# Patient Record
Sex: MALE | Race: WHITE | NOT HISPANIC OR LATINO | Employment: FULL TIME | URBAN - NONMETROPOLITAN AREA
[De-identification: names, ages, dates, MRNs, and addresses within clinical notes are randomized per-mention and may not be internally consistent; named-entity substitution may affect disease eponyms.]

---

## 2017-06-06 ENCOUNTER — HISTORY (OUTPATIENT)
Dept: FAMILY MEDICINE | Facility: OTHER | Age: 22
End: 2017-06-06

## 2017-06-06 ENCOUNTER — OFFICE VISIT - GICH (OUTPATIENT)
Dept: FAMILY MEDICINE | Facility: OTHER | Age: 22
End: 2017-06-06

## 2017-06-06 DIAGNOSIS — M76.62 ACHILLES TENDINITIS OF LEFT LOWER EXTREMITY: ICD-10-CM

## 2017-12-27 NOTE — PROGRESS NOTES
"Patient Information     Patient Name MRN Sex Den Tamez [id number] Male 1995      Progress Notes by Kelly Busby MD at 2017  1:15 PM     Author:  Kelly Busby MD Service:  (none) Author Type:  Physician     Filed:  2017  1:50 PM Encounter Date:  2017 Status:  Signed     :  Kelly Busby MD (Physician)            SUBJECTIVE:  Den Catalan is a 21 y.o. male who complains of pain in left heel to back of foot area. 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 This started on 2017.   Immediate symptoms: immediate pain and no significant . Symptoms have been waxing and waning since that time. It seems better in the morning when he wakes up and then worse as the day goes on. Is especially uncomfortable if he wears the rubber boots which have minimal support and his foot moves around a lot in the boat. 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 Prior to this he was working up to 13 hours a day but finds he can't work that long due to the pain. He is taking some ibuprofen but not regularly. Has tried applying ice at the end of the day and did see a chiropractor on 2017 with some tape applied. Prior history of related problems: no prior problems with this area in the past.    OBJECTIVE:  /82  Pulse 84  Ht 1.8 m (5' 10.87\")  Wt 74.4 kg (164 lb)  BMI 22.96 kg/m2    Appearance: in no apparent distress.  Foot/ankle exam: soft tissue  tenderness at the left Achilles tendon diffusely and a little bit up into the posterior calf musculature..  X-ray: not indicated.    ASSESSMENT:  1. Achilles tendinitis of left lower extremity          PLAN:  Ibuprofen 600 mg take 3 times daily with food.  Limit activities and he will stop working in the boots until this is improving. He thinks he can either work " shorter hours or work more hours at his part-time job at the liquor store which doesn't bother it as much.  Rest and ice after activity.  Given a postop shoe with Velcro to see if that helps with discomfort.  Consider physical therapy if not improving.  Kelly Busby MD  1:50 PM 6/6/2017

## 2017-12-28 NOTE — PATIENT INSTRUCTIONS
Patient Information     Patient Name MRN Sex Den Tamez [id number] Male 1995      Patient Instructions by Kelly Busby MD at 2017  1:22 PM     Author:  Kelly Busby MD  Service:  (none) Author Type:  Physician     Filed:  2017  1:26 PM  Encounter Date:  2017 Status:  Addendum     :  Kelly Busby MD (Physician)        Related Notes: Original Note by Kelly Busby MD (Physician) filed at 2017  1:22 PM               Index Irish All languages Exercises Related topics   Achilles Tendon Injury   ________________________________________________________________________  KEY POINTS    An Achilles tendon injury is stretching or tearing of the strong band of tissue that attaches your heel bone to the calf muscle.    Change or stop doing the activities that cause pain until the injury heals.    An Achilles tendon injury can be treated with special shoes or shoe inserts, exercise, ice, and sometimes with medicine or surgery.  ________________________________________________________________________  What is an Achilles tendon injury?   An Achilles tendon injury is a problem with the tendon that connects your heel bone to the calf muscle of your lower leg. Tendons are strong bands of tissue that attach muscle to bone. You use the Achilles tendon when you point your foot down and when you walk, run, or jump.  Tendons can be injured suddenly or they may be slowly damaged over time. You can have tiny or partial tears in your tendon. If you have a complete tear of your tendon, it s called a rupture. Other tendon injuries may be called a strain, tendinosis, or tendonitis.   What is the cause?   Achilles tendon injuries can be caused by:    Overuse of the tendon, such as from lots of uphill running, intense exercise, or sports training or from doing a lot of work that causes you to bend at the knees and ankles    A sudden activity that twists or tears your tendon, such as  jumping, starting to sprint, or falling  You are more likely to have an Achilles tendon problem if you:    Have tight calf muscles or a tight Achilles tendon    Change the type of running shoes you wear, or if you wear high heels most of the day and then switch to lower heeled shoes for exercise    Have a problem called over-pronation, which happens when your feet roll inward and flatten out more than normal when you walk or run  What are the symptoms?   Symptoms may include:    Pain, stiffness, weakness, or swelling in the back of your lower leg    Pain in the back of your leg or ankle when you rise up on your toes    Trouble pointing your foot downward  If the tendon is completely torn, you may have felt a pop at the time of the injury. You may not be able to lift your heel off the ground or point your toes.  How is it diagnosed?   Your healthcare provider will ask about your symptoms, activities, and medical history and examine you. Your provider may ask to watch you walk or run to see if your feet flatten more than normal. Tests may include:    X-rays    An MRI, which uses a strong magnetic field and radio waves to show detailed pictures of your foot and leg  How is it treated?   You will need to change or stop doing the activities that cause pain until your tendon has healed. For example, you may need to swim instead of run.   Your healthcare provider may recommend stretching and strengthening exercises to help you heal.   Special shoes or shoe inserts may help. If you have a severe injury, your healthcare provider may put your foot in a cast or boot for several weeks to keep it from moving while it heals.   If your tendon is torn, you may need surgery to repair the tendon.  The pain often gets better within a few weeks with self-care, but some injuries may take several months or longer to heal. It s important to follow all of your healthcare provider s instructions.  How can I take care of myself?  To keep  swelling down and help relieve pain:    Put an ice pack, gel pack, or package of frozen vegetables wrapped in a cloth on the injured area every 3 to 4 hours for up to 20 minutes at a time.    Do ice massage. To do this, freeze water in a Styrofoam cup, then peel the top of the cup away to expose the ice. Hold the bottom of the cup and rub the ice over the painful area for 5 to 10 minutes. Do this several times a day while you have pain.    Keep your foot up on pillows when you sit or lie down.    Take nonprescription pain medicine, such as acetaminophen, ibuprofen, or naproxen. Read the label and take as directed. Unless recommended by your healthcare provider, you should not take these medicines for more than 10 days.    Nonsteroidal anti-inflammatory medicines (NSAIDs), such as ibuprofen, naproxen, and aspirin, may cause stomach bleeding and other problems. These risks increase with age.    Acetaminophen may cause liver damage or other problems. Unless recommended by your provider, don't take more than 3000 milligrams (mg) in 24 hours. To make sure you don t take too much, check other medicines you take to see if they also contain acetaminophen. Ask your provider if you need to avoid drinking alcohol while taking this medicine.    Put moist heat on the sore area for 10 to 15 minutes before you do warm-up and stretching exercises. Moist heat may help relax your muscles. Moist heat includes heat patches or moist heating pads that you can buy at most drugstores, a warm wet washcloth, or a hot shower. To prevent burns to your skin, follow directions on the package and do not lie on any type of hot pad. Don t use heat if you have swelling.  Follow your healthcare provider's instructions, including any exercises recommended by your provider. Ask your provider:    How and when you will get your test results    How long it will take to recover    If there are activities you should avoid and when you can return to your  normal activities    How to take care of yourself at home    What symptoms or problems you should watch for and what to do if you have them  Make sure you know when you should come back for a checkup. Keep all appointments for provider visits or tests.  How can I help prevent an Achilles tendon problem?   Warm-up exercises and stretching before activities can help prevent injuries. If you have tight Achilles tendons or calf muscles, stretch them twice a day whether or not you are doing any activities that day. If your leg or ankle hurts after exercise, putting ice on it may help keep it from getting injured.   Avoid running uphill if you tend to have Achilles tendon injuries.   Follow the safety rules and use the protective equipment recommended for your work or sport.  Developed by [employer].  Adult Advisor 2016.3 published by [employer].  Last modified: 2016-03-23  Last reviewed: 2014-10-13  This content is reviewed periodically and is subject to change as new health information becomes available. The information is intended to inform and educate and is not a replacement for medical evaluation, advice, diagnosis or treatment by a healthcare professional.  References   Adult Advisor 2016.3 Index    Copyright   2016 [employer], a division of [employer] All rights reserved.

## 2018-01-25 VITALS
SYSTOLIC BLOOD PRESSURE: 132 MMHG | HEART RATE: 84 BPM | HEIGHT: 71 IN | WEIGHT: 164 LBS | DIASTOLIC BLOOD PRESSURE: 82 MMHG | BODY MASS INDEX: 22.96 KG/M2

## 2018-01-30 ENCOUNTER — DOCUMENTATION ONLY (OUTPATIENT)
Dept: FAMILY MEDICINE | Facility: OTHER | Age: 23
End: 2018-01-30

## 2018-01-30 PROBLEM — D70.9 NEUTROPENIA (H): Status: ACTIVE | Noted: 2018-01-30

## 2018-01-30 PROBLEM — L60.0 ONYCHOCRYPTOSIS: Status: ACTIVE | Noted: 2018-01-30

## 2018-01-30 PROBLEM — D80.1 HYPOGAMMAGLOBULINEMIA (H): Status: ACTIVE | Noted: 2018-01-30

## 2018-01-30 RX ORDER — IBUPROFEN 600 MG/1
600 TABLET, FILM COATED ORAL
COMMUNITY
Start: 2017-06-06 | End: 2021-11-10

## 2018-07-23 NOTE — PROGRESS NOTES
Patient Information     Patient Name  Den Catalan MRN  [id number] Sex  Male   1995      Letter by Kelly Busby MD at      Author:  Kelly Busby MD Service:  (none) Author Type:  (none)    Filed:   Encounter Date:  2017 Status:  (Other)             RE:  Den Catalan  [id number] [facility] [address]          2017     Den Catalan requires limited walking and supportive shoe wear due to ankle tendinitis in left foot. Loose rubber boots may increase the pain.     Sincerely,    Kelly Busby MD  1:28 PM 2017

## 2021-11-10 ENCOUNTER — OFFICE VISIT (OUTPATIENT)
Dept: FAMILY MEDICINE | Facility: OTHER | Age: 26
End: 2021-11-10
Attending: FAMILY MEDICINE
Payer: COMMERCIAL

## 2021-11-10 VITALS
HEART RATE: 83 BPM | DIASTOLIC BLOOD PRESSURE: 72 MMHG | OXYGEN SATURATION: 98 % | WEIGHT: 198.6 LBS | TEMPERATURE: 98.1 F | SYSTOLIC BLOOD PRESSURE: 134 MMHG | BODY MASS INDEX: 27.8 KG/M2 | RESPIRATION RATE: 16 BRPM

## 2021-11-10 DIAGNOSIS — B07.0 PLANTAR WART: Primary | ICD-10-CM

## 2021-11-10 PROCEDURE — 17110 DESTRUCTION B9 LES UP TO 14: CPT | Performed by: FAMILY MEDICINE

## 2021-11-10 ASSESSMENT — PAIN SCALES - GENERAL: PAINLEVEL: MILD PAIN (2)

## 2021-11-10 NOTE — NURSING NOTE
Patient here for treatment of wart on the right foot. He tried and failed home treatment. Medication Reconciliation: complete.    Rosa Maria Martell LPN  11/10/2021 1:49 PM

## 2021-11-10 NOTE — PROGRESS NOTES
SUBJECTIVE:   Den Catalan is a 26 year old male who presents to clinic today for the following health issues: Plantar warts    Patient arrives here for treatment.  He has 4 plantar warts on his right foot.  He has been using over-the-counter treatment without success.        Patient Active Problem List    Diagnosis Date Noted     Hypogammaglobulinemia (H) 01/30/2018     Priority: Medium     Neutropenia (H) 01/30/2018     Priority: Medium     Onychocryptosis 01/30/2018     Priority: Medium     H/O vitamin D deficiency 12/17/2013     Priority: Medium     Herpes simplex virus (HSV) infection 01/19/2011     Priority: Medium     Tinnitus 01/19/2011     Priority: Medium     Other acne 08/06/2010     Priority: Medium     Chronic rhinitis 04/08/2010     Priority: Medium     Past Medical History:   Diagnosis Date     Abnormal immunological findings in specimens from other organs, systems and tissues     NO LIVE VIRUS VACCINES,Follow-up with immunology clinic every 6 months     Acute upper respiratory infection     03/10/2009,Viral upper respiratory infection.  If frequent illnesses, would recommend complete blood count and repeat immunoglobulins to see if deficiency is recurring.     Immunodeficiency with predominantly antibody defects (H)     04/15/2009,(04/15/09) Immunoglobulin sent        Review of Systems     OBJECTIVE:     /72   Pulse 83   Temp 98.1  F (36.7  C)   Resp 16   Wt 90.1 kg (198 lb 9.6 oz)   SpO2 98%   BMI 27.80 kg/m    Body mass index is 27.8 kg/m .  Physical Exam  Constitutional:       Appearance: Normal appearance.   Skin:     Comments: Is for flat warts on his foot.   Neurological:      Mental Status: He is alert.         Diagnostic Test Results:  none     ASSESSMENT/PLAN:         (B07.0) Plantar wart  (primary encounter diagnosis)  Comment:   Plan: DESTRUCT BENIGN LESION, UP TO 14  Cryo x3.  Follow-up in 3 weeks if still present.        Willi Garsia MD  [facility] AND  [facility]

## 2023-09-20 ENCOUNTER — OFFICE VISIT (OUTPATIENT)
Dept: FAMILY MEDICINE | Facility: OTHER | Age: 28
End: 2023-09-20
Attending: NURSE PRACTITIONER
Payer: COMMERCIAL

## 2023-09-20 VITALS
RESPIRATION RATE: 16 BRPM | OXYGEN SATURATION: 99 % | SYSTOLIC BLOOD PRESSURE: 122 MMHG | DIASTOLIC BLOOD PRESSURE: 84 MMHG | WEIGHT: 211.8 LBS | BODY MASS INDEX: 29.65 KG/M2 | TEMPERATURE: 97.8 F | HEART RATE: 91 BPM

## 2023-09-20 DIAGNOSIS — B02.9 HERPES ZOSTER WITHOUT COMPLICATION: ICD-10-CM

## 2023-09-20 DIAGNOSIS — R21 BLISTERING RASH: Primary | ICD-10-CM

## 2023-09-20 PROCEDURE — 99203 OFFICE O/P NEW LOW 30 MIN: CPT | Performed by: NURSE PRACTITIONER

## 2023-09-20 RX ORDER — VALACYCLOVIR HYDROCHLORIDE 1 G/1
1000 TABLET, FILM COATED ORAL 3 TIMES DAILY
Qty: 21 TABLET | Refills: 0 | Status: SHIPPED | OUTPATIENT
Start: 2023-09-20 | End: 2023-09-27

## 2023-09-20 RX ORDER — TRIAMCINOLONE ACETONIDE 5 MG/G
1 OINTMENT TOPICAL 2 TIMES DAILY
Qty: 14 G | Refills: 0 | Status: SHIPPED | OUTPATIENT
Start: 2023-09-20 | End: 2023-09-27

## 2023-09-20 ASSESSMENT — PAIN SCALES - GENERAL: PAINLEVEL: EXTREME PAIN (8)

## 2023-09-20 NOTE — PATIENT INSTRUCTIONS
Rashes concerning for herpes zoster virus, shingles.    Recommend antiviral treatment with Valtrex 1 tablet 3 times a day for 7 days.    Recommend considering an anti-inflammatory such as ibuprofen 600 mg 3 times a day with meals.    No deodorant to left axillary area until wound is healed.  Avoid pools, bath tubs, hot tubs to avoid secondary infection.

## 2023-09-20 NOTE — NURSING NOTE
Patient presents to clinic for concern of rash under left underarm  /84   Pulse 91   Temp 97.8  F (36.6  C) (Tympanic)   Resp 16   Wt 96.1 kg (211 lb 12.8 oz)   SpO2 99%   BMI 29.65 kg/m    Nga Mercer LPN on 9/20/2023 at 11:52 AM

## 2023-09-20 NOTE — PROGRESS NOTES
Den Catalan  1995    ASSESSMENT/PLAN:   1. Blistering rash  Concern for   2. Herpes zoster without complication  - triamcinolone (KENALOG) 0.5 % external ointment; Apply 1 g topically 2 times daily for 7 days  Dispense: 14 g; Refill: 0  - valACYclovir (VALTREX) 1000 mg tablet; Take 1 tablet (1,000 mg) by mouth 3 times daily for 7 days  Dispense: 21 tablet; Refill: 0    Patient presents with unilateral left axillary vesicular rash associated with a burning pain.  Any contact rash causes a burning discomfort.  Rash is nonpruritic.  Denies any other associated systemic symptoms.  No change in hygiene products or personal care products.  He has not been outdoors in the woods or hiking.  Reviewed with patient that vesicular rash appears most consistent with a viral rash, and I do have concern for herpes zoster's shingles rash.  We discussed etiology of shingles, following dermatome and the presenting symptoms.  I recommend trialing antiviral treatment with Valtrex 3 times daily for 7 days.  I will also prescribe topical triamcinolone ointment for him to use as needed for inflammation and should rash become pruritic.  He may use Tylenol and/or ibuprofen as needed for discomfort.If rash begins spreading to right hemisphere of body, has change in characteristics or symptoms or worsening with treatment plan I recommend he return for further evaluation.      Patient agrees with plan of care and verbalizes understating. AVS printed. Patient education provided verbally and written instructions provided as requested. Patient made aware of emergent signs and symptoms to monitor for and when to seek additional care/follow up.     SUBJECTIVE:   CHIEF COMPLAINT/ REASON FOR VISIT  Patient presents with:  Derm Problem: Rash under left underarm      HISTORY OF PRESENT ILLNESS  Den Catalan is a pleasant 27 year old male presents to [facility] today concerns for left axillary rash.  Patient states on Saturday at work he  noticed a sore, like a popped blister on his left armpit.  In general his armpit was tender.  He then took a shower and apply deodorant and experienced a burning pain.  He denies any new soaps, products or deodorants.  He stopped using deodorant now due to it causing worsening irritation.  He now noticed worsening rash spreading under his left armpit, some with intact blisters, some with open sores.  There is some drainage.  He describes this as a burning pain.  Nonpruritic.  No other associated symptoms.  No fever.  No headache.    I have reviewed the nursing notes.  I have reviewed allergies, medication list, problem list, and past medical history.    REVIEW OF SYSTEMS  Review of Systems   Skin:  Positive for rash.   All other systems reviewed and are negative.     VITAL SIGNS  Vitals:    09/20/23 1150   BP: 122/84   Pulse: 91   Resp: 16   Temp: 97.8  F (36.6  C)   TempSrc: Tympanic   SpO2: 99%   Weight: 96.1 kg (211 lb 12.8 oz)      Body mass index is 29.65 kg/m .    OBJECTIVE:   PHYSICAL EXAM  Physical Exam  Vitals reviewed.   Constitutional:       Appearance: Normal appearance. He is not ill-appearing or toxic-appearing.   HENT:      Head: Normocephalic and atraumatic.      Nose: Nose normal.   Eyes:      Conjunctiva/sclera: Conjunctivae normal.   Cardiovascular:      Rate and Rhythm: Normal rate and regular rhythm.      Pulses: Normal pulses.      Heart sounds: Normal heart sounds. No murmur heard.  Pulmonary:      Effort: Pulmonary effort is normal.      Breath sounds: Normal breath sounds. No wheezing.   Skin:     Comments: Scattered erythematous, papular lesions, fluid-filled vesicular lesions with some vesicles popped with surrounding dry, flaky skin.  Burning pain sensation, nonpruritic.   Neurological:      General: No focal deficit present.      Mental Status: He is alert and oriented to person, place, and time.   Psychiatric:         Mood and Affect: Mood normal.         Behavior: Behavior normal.          Thought Content: Thought content normal.         Judgment: Judgment normal.          DIAGNOSTICS  No results found for any visits on 09/20/23.     Che Roldan NP  [facility] & [facility]

## 2024-05-04 ENCOUNTER — HEALTH MAINTENANCE LETTER (OUTPATIENT)
Age: 29
End: 2024-05-04

## 2024-12-03 ENCOUNTER — OFFICE VISIT (OUTPATIENT)
Dept: FAMILY MEDICINE | Facility: OTHER | Age: 29
End: 2024-12-03
Attending: FAMILY MEDICINE

## 2024-12-03 VITALS
DIASTOLIC BLOOD PRESSURE: 64 MMHG | BODY MASS INDEX: 31.19 KG/M2 | SYSTOLIC BLOOD PRESSURE: 140 MMHG | OXYGEN SATURATION: 98 % | TEMPERATURE: 98.2 F | HEART RATE: 88 BPM | RESPIRATION RATE: 20 BRPM | WEIGHT: 222.8 LBS

## 2024-12-03 DIAGNOSIS — L60.0 INGROWN NAIL: Primary | ICD-10-CM

## 2024-12-03 ASSESSMENT — PAIN SCALES - GENERAL: PAINLEVEL_OUTOF10: MILD PAIN (3)

## 2024-12-03 NOTE — NURSING NOTE
Patient here for bilateral great ingrown toenails. Medication Reconciliation: complete.    Rosa Maria Martell LPN  12/3/2024 1:38 PM

## 2024-12-04 PROBLEM — D70.9 NEUTROPENIA (H): Status: RESOLVED | Noted: 2018-01-30 | Resolved: 2024-12-04

## 2024-12-04 PROBLEM — L60.0 INGROWN NAIL: Status: ACTIVE | Noted: 2024-12-04

## 2024-12-04 NOTE — PROGRESS NOTES
SUBJECTIVE:   Den Catalan is a 29 year old male who presents to clinic today for the following health issues: Patient arrives here for ingrown toenails.  Said ingrown toenails for years and constantly is cutting them back so they feel better.  Would like something done    History of Present Illness       Reason for visit:  Ingrown toes   He is taking medications regularly.        Patient Active Problem List    Diagnosis Date Noted    Ingrown nail 12/04/2024     Priority: Medium    Plantar wart 11/10/2021     Priority: Medium    Hypogammaglobulinemia (H) 01/30/2018     Priority: Medium    Onychocryptosis 01/30/2018     Priority: Medium    H/O vitamin D deficiency 12/17/2013     Priority: Medium    Tinnitus 01/19/2011     Priority: Medium    Other acne 08/06/2010     Priority: Medium    Chronic rhinitis 04/08/2010     Priority: Medium     Past Medical History:   Diagnosis Date    Abnormal immunological findings in specimens from other organs, systems and tissues     NO LIVE VIRUS VACCINES,Follow-up with immunology clinic every 6 months    Acute upper respiratory infection     03/10/2009,Viral upper respiratory infection.  If frequent illnesses, would recommend complete blood count and repeat immunoglobulins to see if deficiency is recurring.    Immunodeficiency with predominantly antibody defects (H)     04/15/2009,(04/15/09) Immunoglobulin sent        Review of Systems     OBJECTIVE:     BP (!) 140/64   Pulse 88   Temp 98.2  F (36.8  C)   Resp 20   Wt 101.1 kg (222 lb 12.8 oz)   SpO2 98%   BMI 31.19 kg/m    Body mass index is 31.19 kg/m .  Physical Exam  Musculoskeletal:      Comments:  Both large toes show bilateral ingrown toenails.  They are not curved and more straight.  Is obvious he is been cutting them back at the edges.   Neurological:      Mental Status: He is alert.         Diagnostic Test Results:  none     ASSESSMENT/PLAN:         (L60.0) Ingrown nail  (primary encounter diagnosis)  Comment:    Discussed options including allowing them to grow out.  Keeping the cuticle and skin away from the edge with a Q-tip.  Also discussed removal and phenol.  For now patient will try to keep the skin away from the ingrown and allow the nail to grow out.  If he is not able to do this he will follow-up for a removal bilateral on both toes      Willi Garsia MD  [facility] AND [facility]

## 2025-01-02 ENCOUNTER — PATIENT OUTREACH (OUTPATIENT)
Dept: FAMILY MEDICINE | Facility: OTHER | Age: 30
End: 2025-01-02
Payer: COMMERCIAL

## 2025-01-02 NOTE — TELEPHONE ENCOUNTER
.Patient Quality Outreach    Patient is due for the following:   Influenza Vaccine    Action(s) Taken:   Schedule a nurse only visit for Influenza Vaccine    Type of outreach:    Sent letter.    Questions for provider review:    None           Ashley Mullen

## 2025-02-13 ENCOUNTER — NURSE TRIAGE (OUTPATIENT)
Dept: NURSING | Facility: CLINIC | Age: 30
End: 2025-02-13
Payer: COMMERCIAL

## 2025-02-14 ENCOUNTER — HOSPITAL ENCOUNTER (OUTPATIENT)
Dept: GENERAL RADIOLOGY | Facility: OTHER | Age: 30
Discharge: HOME | End: 2025-02-14
Attending: PHYSICIAN ASSISTANT
Payer: COMMERCIAL

## 2025-02-14 DIAGNOSIS — R10.9 LEFT FLANK PAIN: ICD-10-CM

## 2025-02-14 DIAGNOSIS — R05.1 ACUTE COUGH: ICD-10-CM

## 2025-02-14 PROCEDURE — 71046 X-RAY EXAM CHEST 2 VIEWS: CPT

## 2025-02-14 PROCEDURE — 71100 X-RAY EXAM RIBS UNI 2 VIEWS: CPT | Mod: LT

## 2025-02-14 NOTE — TELEPHONE ENCOUNTER
"Nurse Triage SBAR    Is this a 2nd Level Triage? NO    Situation:  Flank and back pain     Background: Pt reports \"unbearable pain, left shoulder blade and lower back, started in low back\" despite taking ibuprofen. Pt rates pain \"9-10\" when laying down for past half hour. Pt reports TA temperature at time of call 99.1. Pt reports primary pain is in left flank area.     Assessment:  Rule out kidney stone     Protocol Recommended Disposition:   Go to ED Now (Or PCP Triage)    Recommendation:  Advised pt he can try taking Tylenol or ibuprofen and see if pain improves.  If no improvement or if pain remains severe for > 1 hour or if difficulty breathing occurs should be seen in ED tonight. Call back if other questions or concerns.     Pt verbalizes understanding and agrees to plan.     Does the patient meet one of the following criteria for ADS visit consideration? 16+ years old, with an [facility] PCP     TIP  Providers, please consider if this condition is appropriate for management at one of our Acute and Diagnostic Services sites.     If patient is a good candidate, please use dotphrase <dot>triageresponse and select Refer to ADS to document.    Reason for Disposition   Patient sounds very sick or weak to the triager    Additional Information   Negative: Passed out (i.e., lost consciousness, collapsed and was not responding)   Negative: Shock suspected (e.g., cold/pale/clammy skin, too weak to stand, low BP, rapid pulse)   Negative: Difficult to awaken or acting confused (e.g., disoriented, slurred speech)   Negative: Sounds like a life-threatening emergency to the triager   Negative: Followed a major injury to the back (e.g., MVA, fall > 10 feet or 3 meters, penetrating injury, etc.)   Negative: [1] SEVERE pain (e.g., excruciating, scale 8-10) AND [2] present > 1 hour   Negative: [1] SEVERE pain (e.g., excruciating, scale 8-10) AND [2] not improved after pain medicine   Negative: [1] Sudden onset of severe flank pain AND " [2] age > 60 years   Negative: [1] Abdominal pain AND [2] age > 60 years   Negative: [1] Unable to urinate (or only a few drops) > 4 hours AND [2] bladder feels very full (e.g., palpable bladder or strong urge to urinate)   Negative: Vomiting   Negative: Weakness of a leg or foot (e.g., unable to bear weight, dragging foot)   Negative: Upper, mid or lower back pain that occurs mainly in the midline     Pt reports left side upper back pain   Negative: Back pain or flank pain during pregnancy    Protocols used: Flank Pain-A-AH

## 2025-03-11 ENCOUNTER — HOSPITAL ENCOUNTER (EMERGENCY)
Facility: OTHER | Age: 30
Discharge: HOME | End: 2025-03-11
Attending: FAMILY MEDICINE
Payer: COMMERCIAL

## 2025-03-11 VITALS
DIASTOLIC BLOOD PRESSURE: 102 MMHG | WEIGHT: 220 LBS | BODY MASS INDEX: 29.8 KG/M2 | HEART RATE: 74 BPM | TEMPERATURE: 97.6 F | SYSTOLIC BLOOD PRESSURE: 143 MMHG | OXYGEN SATURATION: 99 % | HEIGHT: 72 IN | RESPIRATION RATE: 18 BRPM

## 2025-03-11 DIAGNOSIS — S05.01XA ABRASION OF RIGHT CORNEA, INITIAL ENCOUNTER: ICD-10-CM

## 2025-03-11 PROCEDURE — 250N000009 HC RX 250: Performed by: FAMILY MEDICINE

## 2025-03-11 PROCEDURE — 99283 EMERGENCY DEPT VISIT LOW MDM: CPT | Performed by: FAMILY MEDICINE

## 2025-03-11 PROCEDURE — 99284 EMERGENCY DEPT VISIT MOD MDM: CPT | Performed by: FAMILY MEDICINE

## 2025-03-11 RX ORDER — KETOROLAC TROMETHAMINE 5 MG/ML
1 SOLUTION OPHTHALMIC 4 TIMES DAILY PRN
Qty: 10 ML | Refills: 0 | Status: SHIPPED | OUTPATIENT
Start: 2025-03-11 | End: 2025-03-18

## 2025-03-11 RX ORDER — TETRACAINE HYDROCHLORIDE 5 MG/ML
1-2 SOLUTION OPHTHALMIC ONCE
Status: COMPLETED | OUTPATIENT
Start: 2025-03-11 | End: 2025-03-11

## 2025-03-11 RX ORDER — BACITRACIN ZINC AND POLYMYXIN B SULFATES 500; 10000 [USP'U]/G; [USP'U]/G
OINTMENT OPHTHALMIC
Qty: 3.5 G | Refills: 0 | Status: SHIPPED | OUTPATIENT
Start: 2025-03-11

## 2025-03-11 RX ADMIN — FLUORESCEIN SODIUM 1 STRIP: 1 STRIP OPHTHALMIC at 05:12

## 2025-03-11 RX ADMIN — TETRACAINE HYDROCHLORIDE 2 DROP: 5 SOLUTION OPHTHALMIC at 05:12

## 2025-03-11 ASSESSMENT — COLUMBIA-SUICIDE SEVERITY RATING SCALE - C-SSRS
1. IN THE PAST MONTH, HAVE YOU WISHED YOU WERE DEAD OR WISHED YOU COULD GO TO SLEEP AND NOT WAKE UP?: NO
6. HAVE YOU EVER DONE ANYTHING, STARTED TO DO ANYTHING, OR PREPARED TO DO ANYTHING TO END YOUR LIFE?: NO
2. HAVE YOU ACTUALLY HAD ANY THOUGHTS OF KILLING YOURSELF IN THE PAST MONTH?: NO

## 2025-03-11 ASSESSMENT — ENCOUNTER SYMPTOMS
EYE PAIN: 1
PHOTOPHOBIA: 1
FEVER: 0
CHILLS: 0

## 2025-03-11 ASSESSMENT — VISUAL ACUITY: OU: 1

## 2025-03-11 ASSESSMENT — ACTIVITIES OF DAILY LIVING (ADL): ADLS_ACUITY_SCORE: 41

## 2025-03-11 NOTE — ED TRIAGE NOTES
Patient arrived POV, states last night he was playing with son and his son accidentally kicked him in the right eye. Patient is concerned that his eye is scratched. Unable to open right eye due to discomfort. Alert, ambulatory.      Triage Assessment (Adult)       Row Name 03/11/25 0453          Triage Assessment    Airway WDL WDL     Additional Documentation Breath Sounds (Group)        Respiratory WDL    Respiratory WDL WDL        Breath Sounds    Breath Sounds All Fields     All Lung Fields Breath Sounds Anterior:;equal bilaterally        Skin Circulation/Temperature WDL    Skin Circulation/Temperature WDL WDL        Cardiac WDL    Cardiac WDL WDL        Peripheral/Neurovascular WDL    Peripheral Neurovascular WDL WDL        Cognitive/Neuro/Behavioral WDL    Cognitive/Neuro/Behavioral WDL WDL

## 2025-03-11 NOTE — ED PROVIDER NOTES
History     Chief Complaint   Patient presents with    Eye Injury     HPI  Den Catalan is a 29 year old male who presents to ED with eye injury.  Last evening patient was holding his 6-month-old son up and his son barefooted kicked him in the eye.  Patient tried to tough it out overnight and even tried to go to work this morning but then realized that he was not going to make it.  Light sensitivity and pain.  No purulent discharge eye is watery.  Blurriness due to watery eyes but he feels like vision otherwise is mostly intact    Reviewed RN notes below, same historyrelayed to me    Patient arrived POV, states last night he was playing with son and his son accidentally kicked him in the right eye. Patient is concerned that his eye is scratched. Unable to open right eye due to discomfort. Alert, ambulatory.   Allergies:  Allergies   Allergen Reactions    Cefprozil Rash       Problem List:    Patient Active Problem List    Diagnosis Date Noted    Ingrown nail 12/04/2024     Priority: Medium    Plantar wart 11/10/2021     Priority: Medium    Hypogammaglobulinemia 01/30/2018     Priority: Medium    Onychocryptosis 01/30/2018     Priority: Medium    H/O vitamin D deficiency 12/17/2013     Priority: Medium    Tinnitus 01/19/2011     Priority: Medium    Other acne 08/06/2010     Priority: Medium    Chronic rhinitis 04/08/2010     Priority: Medium        Past Medical History:    Past Medical History:   Diagnosis Date    Abnormal immunological findings in specimens from other organs, systems and tissues     Acute upper respiratory infection     Immunodeficiency with predominantly antibody defects        Past Surgical History:    Past Surgical History:   Procedure Laterality Date    EXCISE CYST GENERIC (LOCATION)      05/23/2007,right wrist    OTHER SURGICAL HISTORY      2004,[id number],ENDOSCOPY GI,Normal endoscopy, colonoscopy, and barium swallow per Dr. Baltazar~03/21/06Acute sinusitis~05/23/07Excision of ganglion cyst on  right wrist       Family History:    Family History   Problem Relation Age of Onset    Other - See Comments Mother         Seizures, petit mal, back problems    Other - See Comments Brother         On IV IG for immune deficiency, bicuspid aortic valve       Social History:  Marital Status:   [2]  Social History     Tobacco Use    Smoking status: Never    Smokeless tobacco: Never    Tobacco comments:     Quit smoking: Mom smokes in the car with him   Vaping Use    Vaping status: Never Used   Substance Use Topics    Alcohol use: Yes     Alcohol/week: 12.0 standard drinks of alcohol     Comment: Alcoholic Drinks/day: occ    Drug use: Never     Types: Other     Comment: Drug use: No        Medications:    ketorolac (ACULAR) 0.5 % ophthalmic solution  bacitracin-Polymyxin B (POLYCIN) OINT  benzonatate (TESSALON) 100 MG capsule  cyclobenzaprine (FLEXERIL) 10 MG tablet  docusate sodium (COLACE) 100 MG capsule  valACYclovir (VALTREX) 1000 mg tablet        Review of Systems   Constitutional:  Negative for chills and fever.   Eyes:  Positive for photophobia and pain.       Physical Exam   BP: (!) 143/102  Pulse: 74  Temp: 97.6  F (36.4  C)  Resp: 18  Height: 182.9 cm (6')  Weight: 99.8 kg (220 lb)  SpO2: 99 %      Physical Exam  Vitals and nursing note reviewed.   Eyes:      General: Lids are normal. Lids are everted, no foreign bodies appreciated. Vision grossly intact. Gaze aligned appropriately.         Right eye: No foreign body.      Extraocular Movements:      Right eye: Normal extraocular motion.      Left eye: Normal extraocular motion.      Conjunctiva/sclera:      Right eye: Right conjunctiva is injected.      Left eye: Left conjunctiva is not injected.      Pupils: Pupils are equal, round, and reactive to light.      Right eye: Corneal abrasion and fluorescein uptake present.      Slit lamp exam:     Right eye: Photophobia present. No corneal ulcer, hyphema or hypopyon.        Comments: Yellow depicts  location and approx size relative to pupil of linear abrasions   Cardiovascular:      Rate and Rhythm: Normal rate.   Pulmonary:      Effort: Pulmonary effort is normal.   Neurological:      Mental Status: He is alert.              No results found for this or any previous visit (from the past 24 hours).    Medications   fluorescein (FUL-GRANT) ophthalmic strip 1 strip (1 strip Both Eyes $Given 3/11/25 0512)   tetracaine (PONTOCAINE) 0.5 % ophthalmic solution 1-2 drop (2 drops Both Eyes $Given 3/11/25 0512)       Assessments & Plan (with Medical Decision Making)     I have reviewed the nursing notes.    I have reviewed the findings, diagnosis, plan and need for follow up with the patient.      Medical Decision Making  The patient's presentation was of low complexity (an acute and uncomplicated illness or injury).    The patient's evaluation involved:  history and exam without other MDM data elements    The patient's management necessitated moderate risk (prescription drug management including medications given in the ED).    Antibiotic ointment, Ketorolac eyedrops.  Recommend rest today and taking the day off work follow-up with eye doctor tomorrow.    Discussed the natural history of corneal abrasion, discussed importance of close follow-up with an eye doctor to make sure the complication of corneal ulcer does not develop and that the corneal abrasion is healing appropriately.  Patient verbalized understanding plan is agreement left ED in apparent condition.  May use Tylenol and/or Motrin as well.  Ketorolac eyedrops, eye doctor tomorrow.    New Prescriptions    BACITRACIN-POLYMYXIN B (POLYCIN) OINT    Apply 0.5 inch strip to lower right lid 4 times daily for 7 days    KETOROLAC (ACULAR) 0.5 % OPHTHALMIC SOLUTION    Place 1 drop into the right eye 4 times daily as needed (pain).       Final diagnoses:   Abrasion of right cornea, initial encounter       3/11/2025   [facility]       David French  MD  03/11/25 0542

## 2025-03-11 NOTE — Clinical Note
Den Catalan was seen and treated in our emergency department on 3/11/2025.  He may return to work on 03/12/2025.       If you have any questions or concerns, please don't hesitate to call.      David French MD

## 2025-05-17 ENCOUNTER — HEALTH MAINTENANCE LETTER (OUTPATIENT)
Age: 30
End: 2025-05-17

## (undated) RX ORDER — TETRACAINE HYDROCHLORIDE 5 MG/ML
SOLUTION OPHTHALMIC
Status: DISPENSED
Start: 2025-03-11